# Patient Record
Sex: MALE | Race: WHITE | NOT HISPANIC OR LATINO
[De-identification: names, ages, dates, MRNs, and addresses within clinical notes are randomized per-mention and may not be internally consistent; named-entity substitution may affect disease eponyms.]

---

## 2024-01-18 ENCOUNTER — NON-APPOINTMENT (OUTPATIENT)
Age: 29
End: 2024-01-18

## 2024-01-18 ENCOUNTER — APPOINTMENT (OUTPATIENT)
Dept: COLORECTAL SURGERY | Facility: CLINIC | Age: 29
End: 2024-01-18
Payer: COMMERCIAL

## 2024-01-18 VITALS
HEART RATE: 88 BPM | TEMPERATURE: 98.3 F | WEIGHT: 160 LBS | OXYGEN SATURATION: 95 % | DIASTOLIC BLOOD PRESSURE: 62 MMHG | BODY MASS INDEX: 24.25 KG/M2 | HEIGHT: 68 IN | SYSTOLIC BLOOD PRESSURE: 112 MMHG | RESPIRATION RATE: 16 BRPM

## 2024-01-18 DIAGNOSIS — Z80.0 FAMILY HISTORY OF MALIGNANT NEOPLASM OF DIGESTIVE ORGANS: ICD-10-CM

## 2024-01-18 DIAGNOSIS — K60.2 ANAL FISSURE, UNSPECIFIED: ICD-10-CM

## 2024-01-18 PROBLEM — Z00.00 ENCOUNTER FOR PREVENTIVE HEALTH EXAMINATION: Status: ACTIVE | Noted: 2024-01-18

## 2024-01-18 PROCEDURE — 99203 OFFICE O/P NEW LOW 30 MIN: CPT

## 2024-01-18 RX ORDER — FLUTICASONE PROPIONATE 50 MCG
SPRAY, SUSPENSION NASAL
Refills: 0 | Status: ACTIVE | COMMUNITY

## 2024-01-18 NOTE — PLAN
[TextEntry] : - Recommended continued bowel regimen - Warm soaks TID - Diltiazem ointment - RTC in 1 month for re-evaluation and completion anorectal exam.

## 2024-01-18 NOTE — PHYSICAL EXAM
[Abdomen Tenderness] : ~T No ~M abdominal tenderness [JVD] : no jugular venous distention  [No Rash or Lesion] : No rash or lesion [Alert] : alert [Calm] : calm [de-identified] : Well appearing male in NAD [de-identified] : MMM [de-identified] : No increased work of breathing [de-identified] : ROM WNL [FreeTextEntry1] : The pt was examined in the left lateral decubitus position with a medical assistant present for the entirety of the examination. Visual examination of the anal verge with effacement of the buttocks revealed an acute posterior midline fissure without signs of chronicity or infection. Otherwise no masses, ulcerations, or skin rashes. Digital rectal exam and anoscopy deferred due to discomfort.  The patient tolerated the exam well.

## 2024-01-18 NOTE — HISTORY OF PRESENT ILLNESS
[FreeTextEntry1] : 28M presenting with a 3 month hx of sharp anal pain with defecation. He notes it started after a change in bowel habits with hardening of his stool. He has since started taking benefiber and colace with easier to pass bowel movements since. Pain has improved during defecation with easier bowel movements however continues to have discomfort throughout the day, predominantly with movement or sitting. Pain is minimal with rest. Denies fevers, chills, diarrhea, bleeding, prolapse, pruritus, discharge, purulence, or incontinence.  Was seen by NP at GI office and JAKE caused considerable discomfort and was prescribe hydrocortisone with lidocaine.  PMH: Seasonal allergies Meds: Flonase All: NKDA PSH: Sinus polyp removal FH: Grandparent with colon cancer in the 50s Cscope: Never

## 2024-11-14 ENCOUNTER — APPOINTMENT (OUTPATIENT)
Dept: COLORECTAL SURGERY | Facility: CLINIC | Age: 29
End: 2024-11-14

## 2024-11-14 VITALS
DIASTOLIC BLOOD PRESSURE: 72 MMHG | WEIGHT: 149 LBS | HEART RATE: 94 BPM | SYSTOLIC BLOOD PRESSURE: 108 MMHG | HEIGHT: 68 IN | TEMPERATURE: 98.7 F | BODY MASS INDEX: 22.58 KG/M2

## 2024-11-14 DIAGNOSIS — M62.838 OTHER MUSCLE SPASM: ICD-10-CM

## 2024-11-14 PROCEDURE — 46600 DIAGNOSTIC ANOSCOPY SPX: CPT

## 2024-11-14 PROCEDURE — 99213 OFFICE O/P EST LOW 20 MIN: CPT | Mod: 25
